# Patient Record
Sex: MALE | Race: WHITE | Employment: UNEMPLOYED | URBAN - METROPOLITAN AREA
[De-identification: names, ages, dates, MRNs, and addresses within clinical notes are randomized per-mention and may not be internally consistent; named-entity substitution may affect disease eponyms.]

---

## 2021-02-10 ENCOUNTER — HOSPITAL ENCOUNTER (EMERGENCY)
Age: 31
Discharge: HOME | End: 2021-02-10
Attending: EMERGENCY MEDICINE

## 2021-02-10 VITALS
HEART RATE: 98 BPM | TEMPERATURE: 97 F | BODY MASS INDEX: 22.35 KG/M2 | SYSTOLIC BLOOD PRESSURE: 119 MMHG | OXYGEN SATURATION: 95 % | DIASTOLIC BLOOD PRESSURE: 77 MMHG | RESPIRATION RATE: 16 BRPM | HEIGHT: 72 IN | WEIGHT: 165 LBS

## 2021-02-10 DIAGNOSIS — L03.114 CELLULITIS OF LEFT UPPER EXTREMITY: Primary | ICD-10-CM

## 2021-02-10 PROCEDURE — 6360000002 HC RX W HCPCS: Performed by: GENERAL PRACTICE

## 2021-02-10 PROCEDURE — 2580000003 HC RX 258: Performed by: GENERAL PRACTICE

## 2021-02-10 PROCEDURE — 99284 EMERGENCY DEPT VISIT MOD MDM: CPT

## 2021-02-10 PROCEDURE — 96365 THER/PROPH/DIAG IV INF INIT: CPT

## 2021-02-10 RX ORDER — SULFAMETHOXAZOLE AND TRIMETHOPRIM 800; 160 MG/1; MG/1
1 TABLET ORAL 2 TIMES DAILY
Qty: 20 TABLET | Refills: 0 | Status: SHIPPED | OUTPATIENT
Start: 2021-02-10 | End: 2021-02-20

## 2021-02-10 RX ORDER — CEPHALEXIN 500 MG/1
500 CAPSULE ORAL 4 TIMES DAILY
Qty: 40 CAPSULE | Refills: 0 | Status: SHIPPED | OUTPATIENT
Start: 2021-02-10 | End: 2021-02-20

## 2021-02-10 RX ADMIN — CEFTRIAXONE SODIUM 1000 MG: 1 INJECTION, POWDER, FOR SOLUTION INTRAMUSCULAR; INTRAVENOUS at 17:47

## 2021-02-10 ASSESSMENT — PAIN DESCRIPTION - DESCRIPTORS: DESCRIPTORS: ACHING;SHARP

## 2021-02-10 ASSESSMENT — ENCOUNTER SYMPTOMS
COUGH: 0
SHORTNESS OF BREATH: 0
VOMITING: 0
COLOR CHANGE: 1
NAUSEA: 0

## 2021-02-10 ASSESSMENT — PAIN DESCRIPTION - ORIENTATION: ORIENTATION: LEFT

## 2021-02-10 NOTE — ED PROVIDER NOTES
[facility]  ED  Emergency Department Encounter  EmergencyMedicine Resident     Pt Name:Stephan Hernandes  MRN: [id number]  [place] 1990  Date of evaluation: 2/10/21  PCP:  No primary care provider on file. CHIEF COMPLAINT       Chief Complaint   Patient presents with    Wound Check       HISTORY OF PRESENT ILLNESS  (Location/Symptom, Timing/Onset, Context/Setting, Quality, Duration, Modifying Factors, Severity.)      Pilar Murdock is a 31 y.o. male who presents with left antecubital fossa arm pain. Patient states that he noticed a bump in this area approximately 4 days ago, last night states that he picked at it was able to get a small amount of orange bloody discharge the rest is been just a clear drainage. States he woke up this morning with his arm swollen having increased pain denies any fever, chills, nausea, vomiting. Patient has pain to the touch, denies any pain moving his elbow. Patient states he has a history of intravenous drug abuse but is not used in the last 4 months. Patient denies any other chronic medical problems does not take any medication to the basis. Patient denies any trauma. PAST MEDICAL / SURGICAL / SOCIAL / FAMILY HISTORY      has a past medical history of Kidney stones. has a past surgical history that includes Lithotripsy.     Social History     Socioeconomic History    Marital status: Single     Spouse name: Not on file    Number of children: Not on file    Years of education: Not on file    Highest education level: Not on file   Occupational History    Not on file   Social Needs    Financial resource strain: Not on file    Food insecurity     Worry: Not on file     Inability: Not on file    Transportation needs     Medical: Not on file     Non-medical: Not on file   Tobacco Use    Smoking status: Current Every Day Smoker     Types: Cigarettes    Smokeless tobacco: Never Used   Substance and Sexual Activity    Alcohol use: Never °C)   Resp 16   Ht 6' (1.829 m)   Wt 165 lb (74.8 kg)   SpO2 95%   BMI 22.38 kg/m²     Physical Exam  Constitutional:       General: He is not in acute distress. Appearance: Normal appearance. He is not ill-appearing, toxic-appearing or diaphoretic. HENT:      Head: Normocephalic and atraumatic. Eyes:      Extraocular Movements: Extraocular movements intact. Pupils: Pupils are equal, round, and reactive to light. Skin:     Findings: Erythema present. Comments: Area of slightly raised indurated draining serosanguineous fluid over the left antecubital fossa, with cellulitis tracking down to the mid forearm. Point-of-care ultrasound was used which shows no evidence of abscess collection, diffuse amount of cobblestoning noted down to the mid forearm, veins are easily compressible no signs of thrombosis. Neurological:      General: No focal deficit present. Mental Status: He is alert and oriented to person, place, and time. Cranial Nerves: No cranial nerve deficit. Motor: No weakness. Gait: Gait normal.   Psychiatric:         Mood and Affect: Mood normal.         Behavior: Behavior normal.         Thought Content: Thought content normal.         Judgment: Judgment normal.             DIFFERENTIAL  DIAGNOSIS     PLAN (LABS / IMAGING / EKG):  No orders of the defined types were placed in this encounter.       MEDICATIONS ORDERED:  Orders Placed This Encounter   Medications    cefTRIAXone (ROCEPHIN) 1000 mg IVPB in 50 mL D5W minibag     Order Specific Question:   Antimicrobial Indications     Answer:   Skin and Soft Tissue Infection    cephALEXin (KEFLEX) 500 MG capsule     Sig: Take 1 capsule by mouth 4 times daily for 10 days     Dispense:  40 capsule     Refill:  0    sulfamethoxazole-trimethoprim (BACTRIM DS) 800-160 MG per tablet     Sig: Take 1 tablet by mouth 2 times daily for 10 days     Dispense:  20 tablet     Refill:  0       DDX: Abscess, cellulitis, carbuncle, fungal    DIAGNOSTIC RESULTS / EMERGENCY DEPARTMENT COURSE / MDM   :  No results found for this visit on 02/10/21. RADIOLOGY:  None    EKG  None    All EKG's are interpreted by the Emergency Department Physician who either signs or Co-signs this chart in the absence of a cardiologist.    EMERGENCY DEPARTMENT COURSE/IMPRESSION: 34-year-old male present emergency department complaining of left arm skin changes as well as drainage. Concern for abscess. Point-of-care ultrasound was used which shows no evidence of abscess, there is cobblestoning noted. No evidence of thrombosis. Discussed risk benefits and alternatives to inpatient versus outpatient treatment. We will plan to give 1 g of Rocephin here in the emergency department sent home with Bactrim and Keflex. Patient is afebrile, not tachycardic low suspicion for sepsis at this time. Patient educated on return precautions should follow-up with his primary care provider in the next 24 hours or return emergency department for reevaluation of wound to ensure that is not progressing. Patient verbalized understanding. PROCEDURES:  None    CONSULTS:  None    CRITICAL CARE:  None    FINAL IMPRESSION      1.  Cellulitis of left upper extremity          DISPOSITION / PLAN     DISPOSITION Decision To Discharge 02/10/2021 06:08:33 PM      PATIENT REFERRED TO:  [place] [address]  [phone]  In 1 day  For wound re-check    [facility]  [address] [address]  [phone]  In 1 day  For wound re-check      DISCHARGE MEDICATIONS:  Discharge Medication List as of 2/10/2021  6:08 PM      START taking these medications    Details   cephALEXin (KEFLEX) 500 MG capsule Take 1 capsule by mouth 4 times daily for 10 days, Disp-40 capsule, R-0Print      sulfamethoxazole-trimethoprim (BACTRIM DS) 800-160 MG per tablet Take 1 tablet by mouth 2 times daily for 10 days, Disp-20 tablet, R-0Print Moses Arellano DO  Emergency Medicine Resident    (Please note that portions of thisnote were completed with a voice recognition program.  Efforts were made to edit the dictations but occasionally words are mis-transcribed.)     Moses Arellano DO  Resident  02/10/21 [id number]

## 2021-02-10 NOTE — ED PROVIDER NOTES
[facility]  Emergency Department  Faculty Attestation     I performed a history and physical examination of the patient and discussed management with the resident. I reviewed the residents note and agree with the documented findings and plan of care. Any areas of disagreement are noted on the chart. I was personally present for the key portions of any procedures. I have documented in the chart those procedures where I was not present during the key portions. I have reviewed the emergency nurses triage note. I agree with the chief complaint, past medical history, past surgical history, allergies, medications, social and family history as documented unless otherwise noted below. For Physician Assistant/ Nurse Practitioner cases/documentation I have personally evaluated this patient and have completed at least one if not all key elements of the E/M (history, physical exam, and MDM). Additional findings are as noted. Primary Care Physician:  No primary care provider on file. Screenings:  [unfilled]    CHIEF COMPLAINT       Chief Complaint   Patient presents with    Wound Check       RECENT VITALS:   Temp: 97 °F (36.1 °C),  Pulse: 98, Resp: 16, BP: 119/77    LABS:  Labs Reviewed - No data to display    Radiology  No orders to display         Attending Physician Additional  Notes    Patient is had 3 days of increasing left arm redness. Initially there was a pustule that he squeezed and had return of orange blood material.  Now has had increasing redness and swelling and discomfort. No fever chills or sweats nausea or lightheadedness. History of IV drug use that he has been clean for quite some time. On exam he is borderline tachycardic but other vital signs are normal.  Is afebrile and nontoxic. There is significant left arm erythema and induration compatible with cellulitis. Bedside ultrasound shows cobblestoning but no fluid accumulation.   There is a central area of elevation with an open ulcer from recent squeezing. Ultrasound over the vein shows no thrombosis. Impression is cellulitis, possible previous abscess. Plan is IV antibiotics here discharge home on antibiotics with return precautions and repeat [facility] visit. Faby Wooten.  Reinaldo Jacob MD, [facility]  Attending Emergency  Physician               Manohar Lee MD  02/10/21 1800